# Patient Record
Sex: FEMALE | Race: WHITE | URBAN - METROPOLITAN AREA
[De-identification: names, ages, dates, MRNs, and addresses within clinical notes are randomized per-mention and may not be internally consistent; named-entity substitution may affect disease eponyms.]

---

## 2020-02-11 ENCOUNTER — TELEPHONE (OUTPATIENT)
Dept: SURGERY | Age: 60
End: 2020-02-11

## 2020-02-11 ENCOUNTER — OFFICE VISIT (OUTPATIENT)
Dept: SURGERY | Age: 60
End: 2020-02-11
Payer: COMMERCIAL

## 2020-02-11 VITALS
DIASTOLIC BLOOD PRESSURE: 92 MMHG | BODY MASS INDEX: 24.63 KG/M2 | HEART RATE: 61 BPM | HEIGHT: 63 IN | WEIGHT: 139 LBS | SYSTOLIC BLOOD PRESSURE: 145 MMHG | OXYGEN SATURATION: 100 %

## 2020-02-11 PROCEDURE — 99213 OFFICE O/P EST LOW 20 MIN: CPT | Performed by: SURGERY

## 2020-02-11 RX ORDER — DICYCLOMINE HCL 20 MG
20 TABLET ORAL EVERY 6 HOURS
COMMUNITY

## 2020-02-11 ASSESSMENT — ENCOUNTER SYMPTOMS
ABDOMINAL PAIN: 1
DIARRHEA: 1
RESPIRATORY NEGATIVE: 1

## 2020-02-11 NOTE — H&P
[address]  [address] [facility] [address] [place]. [address], [address]        Becki Joe   62 y.o.  1960  [id number]     PCP: Delia Whitney DO  Referring Provider: No ref. provider found   Author:   Dr. Peg Moore MD  Reason for Visit:  Chief Complaint   Patient presents with    New Patient     Patient states she is being seen to discuss having a colonoscopy. She states her sister had colon cancer, so she gets a colonoscopy every 5 years. HPI:  Becki Joe presents in evaluation for Screening Colonoscopy. Has a known positive family history of colorectal cancer (sister). Prior colonoscopy was 2014. Patient does report apparent ongoing issues with IBS type symptoms of urgency,  crampy abdominal pain and episodes of diarrhea. Review of Systems   Constitutional: Negative. Respiratory: Negative. Cardiovascular: Negative. Gastrointestinal: Positive for abdominal pain and diarrhea. Musculoskeletal: Negative. Neurological: Negative. Allergies: Allergies   Allergen Reactions    Tetracyclines & Related        Current Medications:  Current Outpatient Medications   Medication Sig Dispense Refill    dicyclomine (BENTYL) 20 MG tablet Take 20 mg by mouth every 6 hours       No current facility-administered medications for this visit. Problem List:  There is no problem list on file for this patient. Histories:  No past medical history on file. No family history on file. No past surgical history on file.    Social History     Socioeconomic History    Marital status:      Spouse name: None    Number of children: None    Years of education: None    Highest education level: None   Occupational History    None   Social Needs    Financial resource strain: None    Food insecurity:     Worry: None     Inability: None    Transportation needs:     Medical: None     Non-medical: None   Tobacco Use    Smoking status: Never Smoker    Smokeless tobacco: Never Used   Substance and Sexual Activity    Alcohol use: None    Drug use: None    Sexual activity: None   Lifestyle    Physical activity:     Days per week: None     Minutes per session: None    Stress: None   Relationships    Social connections:     Talks on phone: None     Gets together: None     Attends [employer] service: None     Active member of club or organization: None     Attends meetings of clubs or organizations: None     Relationship status: None    Intimate partner violence:     Fear of current or ex partner: None     Emotionally abused: None     Physically abused: None     Forced sexual activity: None   Other Topics Concern    None   Social History Narrative    None        Physical Examination:  BP (!) 145/92 (Site: Left Upper Arm, Position: Sitting, Cuff Size: Medium Adult)   Pulse 61   Ht 5' 3\" (1.6 m)   Wt 139 lb (63 kg)   SpO2 100%   BMI 24.62 kg/m²     Neck: Supple  Respiratory:  Lungs are clear to auscultation  Cardiovascular:  Normal Rate and Rhythm  Gastrointestinal:    Abdomen:  Soft, non-tender, no masses   Rectum/Anus:  Deferred to procedure  Integumentary:  Warm and dry  Neurologic:  Alert     Impression and Plan:    Diagnoses:     Diagnosis Orders   1. Encounter for screening colonoscopy     2. Family history of colon cancer     3. Irritable bowel syndrome with diarrhea        Plan:  Procedure; Colonoscopy  Will also refer to gastroenterology for evaluation of her longstanding IBS. Patient Instructions:  Risks including bleeding, reaction to sedation, and perforation discussed. she does request to proceed. Preoperative bowel prep instructions given to the patient.      Electronically signed by Fahad Poole MD on 2/11/20 at 11:24 AM

## 2020-03-16 ENCOUNTER — TELEPHONE (OUTPATIENT)
Dept: SURGERY | Age: 60
End: 2020-03-16

## 2020-03-16 NOTE — TELEPHONE ENCOUNTER
Patient states she has a colonoscopy scheduled for 3/27/20 with Dr Hal Gavin. She would like to reschedule to at least a month or 2 out. Please call patient to reschedule.      AG

## 2020-03-20 NOTE — TELEPHONE ENCOUNTER
Called patient back to see how she would like to proceed regarding her procedure. We can reschedule for end of May or early June, or we can cancel for now, and call her at a later time to reschedule. I left a VM asking her to call the office.

## 2020-05-04 ENCOUNTER — TELEPHONE (OUTPATIENT)
Dept: SURGERY | Age: 60
End: 2020-05-04
